# Patient Record
Sex: FEMALE | Race: WHITE | Employment: UNEMPLOYED | URBAN - METROPOLITAN AREA
[De-identification: names, ages, dates, MRNs, and addresses within clinical notes are randomized per-mention and may not be internally consistent; named-entity substitution may affect disease eponyms.]

---

## 2023-06-22 NOTE — H&P
History and Physical Service   [facility]    HISTORY AND PHYSICAL EXAMINATION            Date of Evaluation: 6/23/2023  Patient name:  Rajiv Valdivia  MRN:   [id number]  YOB: 1950  PCP:    Jason Prado MD    History Obtained From:     Patient, medical records     History of Present Illness: This is Rajiv Valdivia a 71 y.o. female who presents today for a COLORECTAL CANCER SCREENING, NOT HIGH RISK by Lori Naylor MD for Encounter for follow-up surveillance of colon cancer. Patient Hx colon cancer since 1991 with surveillance colonoscopies every 3 years. She was scheduled and arrived for her procedure. Patient completed the prep but had problems with generalized itching and nausea but no rash She went  until watery clear. Strong FH colon cancer and polyps on both Paternal and Maternal side. Patient denies bowel changes, bloody tarry stools, diarrhea alternating with constipation, abdominal pain or unexplained weight loss. Today no fever, chills, cough, wheezing, increased SOB or chest pain. Patient does not want anesthesia  She drank coffee with cream this am    Past Medical History:     Past Medical History:   Diagnosis Date    Cancer [facility])         Past Surgical History:     Past Surgical History:   Procedure Laterality Date    APPENDECTOMY      HYSTERECTOMY (CERVIX STATUS UNKNOWN)      TONSILLECTOMY      TUBAL LIGATION          Medications Prior to Admission:     Prior to Admission medications    Not on File        Allergies:     Patient has no known allergies. Social History:     Tobacco:    has no history on file for tobacco use. Alcohol:      has no history on file for alcohol use. Drug Use:  has no history on file for drug use. Family History:     No family history on file. Review of Systems:   Pertinent findings in the HPI above.   A comprehensive review of systems was essentially negative Denies fever, chills, cough, wheezing,  exertional chest pain, dyspnea,

## 2023-06-23 ENCOUNTER — HOSPITAL ENCOUNTER (OUTPATIENT)
Age: 73
Setting detail: OUTPATIENT SURGERY
Discharge: HOME | End: 2023-06-23
Attending: COLON & RECTAL SURGERY | Admitting: COLON & RECTAL SURGERY
Payer: MEDICARE

## 2023-06-23 ENCOUNTER — ANESTHESIA EVENT (OUTPATIENT)
Facility: HOSPITAL | Age: 73
End: 2023-06-23
Payer: MEDICARE

## 2023-06-23 ENCOUNTER — ANESTHESIA (OUTPATIENT)
Facility: HOSPITAL | Age: 73
End: 2023-06-23
Payer: MEDICARE

## 2023-06-23 VITALS
TEMPERATURE: 97.3 F | HEIGHT: 64 IN | SYSTOLIC BLOOD PRESSURE: 141 MMHG | OXYGEN SATURATION: 98 % | RESPIRATION RATE: 19 BRPM | HEART RATE: 60 BPM | BODY MASS INDEX: 20.32 KG/M2 | WEIGHT: 119 LBS | DIASTOLIC BLOOD PRESSURE: 85 MMHG

## 2023-06-23 DIAGNOSIS — Z08 ENCOUNTER FOR FOLLOW-UP SURVEILLANCE OF COLON CANCER: ICD-10-CM

## 2023-06-23 DIAGNOSIS — Z85.038 ENCOUNTER FOR FOLLOW-UP SURVEILLANCE OF COLON CANCER: ICD-10-CM

## 2023-06-23 PROCEDURE — 2709999900 HC NON-CHARGEABLE SUPPLY: Performed by: COLON & RECTAL SURGERY

## 2023-06-23 PROCEDURE — 3609010400 HC COLONOSCOPY POLYPECTOMY HOT BIOPSY: Performed by: COLON & RECTAL SURGERY

## 2023-06-23 PROCEDURE — 7100000011 HC PHASE II RECOVERY - ADDTL 15 MIN: Performed by: COLON & RECTAL SURGERY

## 2023-06-23 PROCEDURE — 2580000003 HC RX 258: Performed by: STUDENT IN AN ORGANIZED HEALTH CARE EDUCATION/TRAINING PROGRAM

## 2023-06-23 PROCEDURE — 7100000010 HC PHASE II RECOVERY - FIRST 15 MIN: Performed by: COLON & RECTAL SURGERY

## 2023-06-23 PROCEDURE — 88305 TISSUE EXAM BY PATHOLOGIST: CPT

## 2023-06-23 RX ORDER — SODIUM CHLORIDE 0.9 % (FLUSH) 0.9 %
5-40 SYRINGE (ML) INJECTION PRN
Status: DISCONTINUED | OUTPATIENT
Start: 2023-06-23 | End: 2023-06-23 | Stop reason: HOSPADM

## 2023-06-23 RX ORDER — SODIUM CHLORIDE, SODIUM LACTATE, POTASSIUM CHLORIDE, CALCIUM CHLORIDE 600; 310; 30; 20 MG/100ML; MG/100ML; MG/100ML; MG/100ML
INJECTION, SOLUTION INTRAVENOUS CONTINUOUS
Status: DISCONTINUED | OUTPATIENT
Start: 2023-06-23 | End: 2023-06-23 | Stop reason: HOSPADM

## 2023-06-23 RX ORDER — SODIUM CHLORIDE 9 MG/ML
INJECTION, SOLUTION INTRAVENOUS CONTINUOUS
Status: DISCONTINUED | OUTPATIENT
Start: 2023-06-23 | End: 2023-06-23 | Stop reason: HOSPADM

## 2023-06-23 RX ORDER — SODIUM CHLORIDE 0.9 % (FLUSH) 0.9 %
5-40 SYRINGE (ML) INJECTION EVERY 12 HOURS SCHEDULED
Status: DISCONTINUED | OUTPATIENT
Start: 2023-06-23 | End: 2023-06-23 | Stop reason: HOSPADM

## 2023-06-23 RX ORDER — CHLORAL HYDRATE 500 MG
CAPSULE ORAL DAILY
COMMUNITY

## 2023-06-23 RX ORDER — SODIUM CHLORIDE 9 MG/ML
INJECTION, SOLUTION INTRAVENOUS PRN
Status: DISCONTINUED | OUTPATIENT
Start: 2023-06-23 | End: 2023-06-23 | Stop reason: HOSPADM

## 2023-06-23 RX ORDER — AMLODIPINE BESYLATE 2.5 MG/1
1 TABLET ORAL DAILY
COMMUNITY
Start: 2023-06-12

## 2023-06-23 RX ORDER — LIDOCAINE HYDROCHLORIDE 10 MG/ML
1 INJECTION, SOLUTION EPIDURAL; INFILTRATION; INTRACAUDAL; PERINEURAL
Status: DISCONTINUED | OUTPATIENT
Start: 2023-06-23 | End: 2023-06-23 | Stop reason: HOSPADM

## 2023-06-23 RX ADMIN — SODIUM CHLORIDE, POTASSIUM CHLORIDE, SODIUM LACTATE AND CALCIUM CHLORIDE: 600; 310; 30; 20 INJECTION, SOLUTION INTRAVENOUS at 07:06

## 2023-06-23 ASSESSMENT — PAIN - FUNCTIONAL ASSESSMENT: PAIN_FUNCTIONAL_ASSESSMENT: 0-10

## 2023-06-23 ASSESSMENT — PAIN DESCRIPTION - DESCRIPTORS: DESCRIPTORS: ACHING

## 2023-06-23 NOTE — DISCHARGE INSTRUCTIONS
[facility]     POST-ENDOSCOPY INSTRUCTIONS    1. ACTIVITY  __x__ No driving , operating machinery, or making important decisions for 24 hours. Resume normal activity after 24 hours. You may return to work after 24 hours. ____ Resume normal activity    2. DIET       _x___ (Colonscopy/Flex Sig):  Resume your usual diet unless specified below. ____ Diet Modification:    3. MEDICATIONS (Do not consume alcohol, tranquilizers, or sleeping medications for           24 hours unless advised by your physician)       __x___ Resume your usual medications (except if taking blood thinners, hold for:   ____ days   ____week      4. PHYSICIAN FOLLOW-UP        _x___ Please call the office for an appointment/further instructions. ___x___3 yrs   ______5 yrs   ______10 yrs            _____ follow up in office in week    ______ days   ______weeks              ______months             ______ years             __x__ See your family physician. 5. ADDITIONAL INSTRUCTIONS          6. NORMAL CHANGES YOU MAY EXPERIENCE AFTER ENDOSCOPY:         COLONOSCOPY  Passing of gas for several hours after procedure  Some mild abdominal cramping  If a biopsy/polypectomy was done, you may see some spotting of blood  You may feel fatigued for the next 24-48 hours due to the prep and sedation    7. CALL YOUR PHYSICIAN IF YOU EXPERIENCE ANY OF THE FOLLOWING      A. Passing blood rectally or vomiting blood (color may be red or black)      B. Severe abdominal pain or tenderness (that is not relieved by passing air)      C.   Fever, chills, or excessive sweating      D.  Persistent nausea or vomiting      E.  Redness or swelling at the IV site    If you have additional questions, PLEASE call your doctor [phone]

## 2023-06-23 NOTE — OP NOTE
Operative Note      Patient: Andrea Alston  YOB: 1950  MRN: [id number]    Date of Procedure: 6/23/2023    Pre-Op Diagnosis Codes:     * Encounter for follow-up surveillance of colon cancer [Z08, Z85.038]    Post-Op Diagnosis: Same       Procedure(s):  COLONOSCOPY POLYPECTOMY HOT BIOPSY    Surgeon(s):  Odette Santiago MD    Assistant:   Resident: Clarice Graves DO    Anesthesia: None    Estimated Blood Loss (mL): Minimal    Complications: None    Specimens:   ID Type Source Tests Collected by Time Destination   A : TRANSVERSE COLON POLYP Tissue Colon-Transverse SURGICAL PATHOLOGY Ferny Kim MD 6/23/2023 0741        Implants:  * No implants in log *      Drains: * No LDAs found *    Findings: Pedunculated polyp noted within the transverse colon. Mild diverticulosis. Patent anastomosis. Detailed Description of Procedure:   HISTORY: The patient is a 71y.o. year old female with history of colon cancer s/p resection. Colonoscopy with possible biopsy or polypectomy has been recommended and I explained the risk, benefits, expected outcome, and alternatives to the procedure. Risks included but are not limited to bleeding, infection, respiratory distress, hypotension, and perforation of the colon. The patient understands and is in agreement. The patient requested that procedure be done without sedation. PROCEDURE: The patient was brought to the endoscopy suite and placed in left lateral decubitus position. The colonoscope was inserted per rectum and advanced under direct vision to the cecum without difficulty. Findings:  Cecum/Ascending colon: normal    Transverse colon: Polyp x1    Descending/Sigmoid colon: Patent anastomosis    Rectum/Anus: normal    The colon was decompressed and the scope was removed. The patient tolerated the procedure well. Dr. Patric Ferraro was present for the entirety of the procedure.      Recommendations:  -Singular polyp removed  -Follow up for pathologic results  -Return

## 2023-06-26 LAB — SURGICAL PATHOLOGY REPORT: NORMAL

## 2024-06-13 RX ORDER — CYST/ALA/Q10/PHOS.SER/DHA/BROC 100-20-50
1 POWDER (GRAM) ORAL DAILY
COMMUNITY

## 2024-06-27 ENCOUNTER — TELEPHONE (OUTPATIENT)
Age: 74
End: 2024-06-27

## 2024-06-27 ENCOUNTER — PROCEDURE VISIT (OUTPATIENT)
Age: 74
End: 2024-06-27
Payer: MEDICARE

## 2024-06-27 VITALS
BODY MASS INDEX: 21.8 KG/M2 | SYSTOLIC BLOOD PRESSURE: 130 MMHG | WEIGHT: 125 LBS | DIASTOLIC BLOOD PRESSURE: 72 MMHG | HEART RATE: 65 BPM

## 2024-06-27 DIAGNOSIS — L90.0 LICHEN SCLEROSUS ET ATROPHICUS: ICD-10-CM

## 2024-06-27 DIAGNOSIS — R32 URINARY INCONTINENCE, UNSPECIFIED TYPE: Primary | ICD-10-CM

## 2024-06-27 LAB
BILIRUBIN, POC: NORMAL
BLOOD URINE, POC: NORMAL
CLARITY, POC: CLEAR
COLOR, POC: YELLOW
GLUCOSE URINE, POC: NORMAL
KETONES, POC: 15
LEUKOCYTE EST, POC: NORMAL
NITRITE, POC: NORMAL
PH, POC: 5
PROTEIN, POC: NORMAL
SPECIFIC GRAVITY, POC: 1.02
UROBILINOGEN, POC: NORMAL

## 2024-06-27 PROCEDURE — 1090F PRES/ABSN URINE INCON ASSESS: CPT | Performed by: OBSTETRICS & GYNECOLOGY

## 2024-06-27 PROCEDURE — 99215 OFFICE O/P EST HI 40 MIN: CPT | Performed by: OBSTETRICS & GYNECOLOGY

## 2024-06-27 PROCEDURE — 3017F COLORECTAL CA SCREEN DOC REV: CPT | Performed by: OBSTETRICS & GYNECOLOGY

## 2024-06-27 PROCEDURE — G8427 DOCREV CUR MEDS BY ELIG CLIN: HCPCS | Performed by: OBSTETRICS & GYNECOLOGY

## 2024-06-27 PROCEDURE — G8420 CALC BMI NORM PARAMETERS: HCPCS | Performed by: OBSTETRICS & GYNECOLOGY

## 2024-06-27 PROCEDURE — 52000 CYSTOURETHROSCOPY: CPT | Performed by: OBSTETRICS & GYNECOLOGY

## 2024-06-27 PROCEDURE — 99459 PELVIC EXAMINATION: CPT | Performed by: OBSTETRICS & GYNECOLOGY

## 2024-06-27 PROCEDURE — G8400 PT W/DXA NO RESULTS DOC: HCPCS | Performed by: OBSTETRICS & GYNECOLOGY

## 2024-06-27 PROCEDURE — 1036F TOBACCO NON-USER: CPT | Performed by: OBSTETRICS & GYNECOLOGY

## 2024-06-27 PROCEDURE — 81003 URINALYSIS AUTO W/O SCOPE: CPT | Performed by: OBSTETRICS & GYNECOLOGY

## 2024-06-27 PROCEDURE — 1123F ACP DISCUSS/DSCN MKR DOCD: CPT | Performed by: OBSTETRICS & GYNECOLOGY

## 2024-06-27 PROCEDURE — 51725 SIMPLE CYSTOMETROGRAM: CPT | Performed by: OBSTETRICS & GYNECOLOGY

## 2024-06-27 PROCEDURE — 0509F URINE INCON PLAN DOCD: CPT | Performed by: OBSTETRICS & GYNECOLOGY

## 2024-06-27 NOTE — PROGRESS NOTES
palpable.  Myalgia and myositis of the pelvic floor not present to contra-indicate a procedure.  No significant complications or exposures of any prior placed mesh.  No significant pelvic organ prolapse contraindicating a procedure..  Rectum:      No rectal mass.   Breasts:     Right: Normal. No swelling, inverted nipple, mass, nipple discharge, skin change or tenderness.      Left: Normal. No swelling, inverted nipple, mass, nipple discharge, skin change or tenderness.   HENT:      Head: Normocephalic and atraumatic.      Right Ear: Tympanic membrane normal.      Left Ear: Tympanic membrane normal.      Nose: Nose normal.      Mouth/Throat:      Mouth: Mucous membranes are moist.      Pharynx: Oropharynx is clear.   Eyes:      Extraocular Movements: Extraocular movements intact.      Conjunctiva/sclera: Conjunctivae normal.   Cardiovascular:      Rate and Rhythm: Normal rate and regular rhythm.   Pulmonary:      Effort: Pulmonary effort is normal.      Breath sounds: Normal breath sounds.   Abdominal:      General: Abdomen is flat. Bowel sounds are normal.      Hernia: There is no hernia in the left inguinal area or right inguinal area.   Musculoskeletal:         General: Normal range of motion.      Cervical back: Normal range of motion and neck supple.   Lymphadenopathy:      Upper Body:      Right upper body: No supraclavicular or axillary adenopathy.      Left upper body: No supraclavicular or axillary adenopathy.   Neurological:      Mental Status: She is oriented to person, place, and time.   Psychiatric:         Mood and Affect: Mood normal.         Behavior: Behavior normal.   Vitals and nursing note reviewed.      Tests: Urodynamic shows a filling CMG of up to 200 mL with negative cough stress testing.  There is no instability on filling.  First desire is at 100 mL first sensation at 150 miles capacity 200 mL.  No detrusor sphincter dyssynergy.    Cystoscopy shows good coaptation at rest and with the

## 2024-06-27 NOTE — TELEPHONE ENCOUNTER
Patient is scheduled for in office BOTOX 8/9/24. She will have urine culture done 10 days prior at [facility] in [place], Patient was given order and it will be in Epic. Please advise how we obtain the BOTOX Rx.

## 2024-06-27 NOTE — PATIENT INSTRUCTIONS
[facility], [facility] UROGYNECOLOGY AND PELVIC REHABILITATION   [address] [place] [address]  Dept: [phone]   Patient:  Chery Richards   :  1950   Visit Date:  2024       [facility] UROGYNECOLOGY & PELVIC REHABILITATION      Cystoscopy Bladder Botox Injection  Patient Information, Treatment, and Anticipated Pre-Operative / Recovery Plan              Before Surgery    *Preadmission laboratory tests will be scheduled for you.  Report to the out-patient testing department at your assigned hospital.  Your tests may include blood work, a urinalysis, chest x-ray, electrocardiogram and a pregnancy test.  *The anesthesiologist will talk with you ahead of surgery regarding the specific type of anesthesia to be administered.  Inform the anesthesiologist if you wear dentures, or have a family history of anesthetic complications.  The pre-anesthesia team will tell you what time to report to the out-patient admitting office the day of your scheduled surgery.  *Do not bring jewelry or valuables with you to the hospital.  *Stopping smoking before surgery if strongly encouraged.    One Day Prior to Surgery     *ABSOLUTELY NO FOOD, LIQUID OR MEDICATION SHOULD BE TAKEN BY MOUTH AFTER MIDNIGHT OF THE DAY PRIOR TO SURGERY.  Please let Dr. Zaldivar and the anesthesiologist know if you take any medicines required daily, have particular sensitivities to preps & medicines or surgical processes (positioning, induction, postop nausea), or have a chronic illness.    After Surgery    *The length of your hospital stay can vary, but is typically 1-2 hours.  *Perform breathing exercises every hour while awake in the hospital to keep lungs clear of excess fluid; walking with assistance later in the day of surgery can help prevent blood clot formation.  *Normal activities, including sex, can be resumed in 1-2 weeks in most cases; meanwhile, do not place

## 2024-07-15 ENCOUNTER — PROCEDURE VISIT (OUTPATIENT)
Age: 74
End: 2024-07-15

## 2024-07-15 DIAGNOSIS — N31.8 FREQUENCY-URGENCY SYNDROME: ICD-10-CM

## 2024-07-15 DIAGNOSIS — R32 URINARY INCONTINENCE, UNSPECIFIED TYPE: Primary | ICD-10-CM

## 2024-07-15 DIAGNOSIS — N39.41 URGE INCONTINENCE: ICD-10-CM

## 2024-07-15 RX ORDER — MINOXIDIL 5 %
SOLUTION, NON-ORAL TOPICAL 2 TIMES DAILY
COMMUNITY
Start: 2024-04-16

## 2024-07-15 NOTE — PROGRESS NOTES
PTNS Questionnaire  Treatment # 1    Symptom Follow Up:  Urinary Incontinence:worse    Incomplete Emptying:Yes  Urinary Urgency: worse    Urinary Frequency:  unchanged    Nocturia: No  Dysuria: No  Leaks during [place]: No    Medical Review:  Caffeine Intake ( per day):3 cup coffee  Alcohol Intake (cups/day):1 cup and drink every 2 weeks  Daytime Voids (#/day):8  Nighttime Voids (#/night) with your: 0  Incontinence Episodes (#/day):1      Prior Testing  Urine Tests: Denies UTI symptoms  Culture: None    Procedure  Needle Placement: right  Treatment Setting:  Stimulation(min): 30  Feeling/Response:  Treatment Plan:    Post Procedure: Patient did tolerate the procedure well.

## 2024-07-22 ENCOUNTER — PROCEDURE VISIT (OUTPATIENT)
Age: 74
End: 2024-07-22

## 2024-07-22 DIAGNOSIS — N39.41 URGE INCONTINENCE: ICD-10-CM

## 2024-07-22 DIAGNOSIS — N31.8 FREQUENCY-URGENCY SYNDROME: ICD-10-CM

## 2024-07-22 DIAGNOSIS — R32 URINARY INCONTINENCE, UNSPECIFIED TYPE: Primary | ICD-10-CM

## 2024-07-22 NOTE — PROGRESS NOTES
PTNS Questionnaire  Treatment # 2    Symptom Follow Up:  Urinary Incontinence:improved    Incomplete Emptying:No  Urinary Urgency: improved    Urinary Frequency:  improved    Nocturia: No  Dysuria: No  Leaks during [place]: No    Medical Review:  Caffeine Intake ( per day):3 cup  Alcohol Intake (cups/day): 0  cup and none  Daytime Voids (#/day):8  Nighttime Voids (#/night) with your:1  Incontinence Episodes (#/day):2      Prior Testing  Urine Tests: Denies UTI symptoms  Culture: None    Procedure  Needle Placement: right  Treatment Setting:  Stimulation(min): 30  Feeling/Response:  Treatment Plan:    Post Procedure: Patient did tolerate the procedure well.

## 2024-07-30 ENCOUNTER — PROCEDURE VISIT (OUTPATIENT)
Age: 74
End: 2024-07-30

## 2024-07-30 VITALS — DIASTOLIC BLOOD PRESSURE: 74 MMHG | SYSTOLIC BLOOD PRESSURE: 116 MMHG

## 2024-07-30 DIAGNOSIS — N39.41 URGE INCONTINENCE: Primary | ICD-10-CM

## 2024-07-30 NOTE — PROGRESS NOTES
PTNS Questionnaire  Treatment # 3    Symptom Follow Up:  Urinary Incontinence:unchanged    Incomplete Emptying:No  Urinary Urgency: improved    Urinary Frequency:  improved    Nocturia: No  Dysuria: No  Leaks during [place]: No    Medical Review:  Caffeine Intake ( per day):3 cup  Alcohol Intake (cups/day): 0  cup  Daytime Voids (#/day):7  Nighttime Voids (#/night) with your: 0  Incontinence Episodes (#/day):2      Prior Testing  Urine Tests: Denies UTI symptoms  Culture: None    Procedure  Needle Placement: right  Treatment Settin  Stimulation(min): 30  Feeling/Response: [place]   Treatment Plan: Continue with treatments     Post Procedure: Patient did tolerate the procedure well.

## 2024-08-06 ENCOUNTER — PROCEDURE VISIT (OUTPATIENT)
Age: 74
End: 2024-08-06

## 2024-08-06 DIAGNOSIS — N39.41 URGE INCONTINENCE: Primary | ICD-10-CM

## 2024-08-06 DIAGNOSIS — R32 URINARY INCONTINENCE, UNSPECIFIED TYPE: ICD-10-CM

## 2024-08-06 DIAGNOSIS — N31.8 FREQUENCY-URGENCY SYNDROME: ICD-10-CM

## 2024-08-06 NOTE — PROGRESS NOTES
PTNS Questionnaire  Treatment # 4    Symptom Follow Up:  Urinary Incontinence:improved    Incomplete Emptying:Yes  Urinary Urgency: improved    Urinary Frequency:  improved    Nocturia: No- never   Dysuria: No  Leaks during [place]: No    Medical Review:  Caffeine Intake ( per day):3 cup  Alcohol Intake (cups/day): 0  cup  Daytime Voids (#/day):7  Nighttime Voids (#/night) with your: 0  Incontinence Episodes (#/day):1  -not every day, noticing improvement     Prior Testing  Urine Tests: Denies UTI symptoms  Culture: None    Procedure  Needle Placement: right  Treatment Settin  Stimulation(min): 30  Feeling/Response:yes   Treatment Plan:continue as planned     Post Procedure: Patient did tolerate the procedure well.     Patient making progress with PTNS. Continue therapy.

## 2024-08-13 ENCOUNTER — PROCEDURE VISIT (OUTPATIENT)
Age: 74
End: 2024-08-13

## 2024-08-13 DIAGNOSIS — L90.0 LICHEN SCLEROSUS ET ATROPHICUS: ICD-10-CM

## 2024-08-13 DIAGNOSIS — R32 URINARY INCONTINENCE, UNSPECIFIED TYPE: ICD-10-CM

## 2024-08-13 DIAGNOSIS — N39.41 URGE INCONTINENCE: Primary | ICD-10-CM

## 2024-08-13 DIAGNOSIS — N31.8 FREQUENCY-URGENCY SYNDROME: ICD-10-CM

## 2024-08-19 NOTE — PROGRESS NOTES
[facility], [facility] UROGYNECOLOGY AND PELVIC REHABILITATION   [address] [place] [address]  Dept: [phone]  Date: 2024  Patient Name: Chery Richards    VISIT - FOLLOW UP VISIT     CC: had no chief complaint listed for this encounter.    Chaperone present: Resident    HPI: 6th PTNS mid treatment follow up    Overall state of well-being, dietary and nutritional habits, exercise routines of at least 30 minutes 3 times a week, bowel and bladder function, smoking history, HRT history, sexual activity and partner/s, dyspareunia, and immunizations all revisited if necessary by chart review or direct questioning.    Topics of Prolapse, Pain, Pressure were revisited including type, period of onset, level of severity, quality and quantity, associations, trends, exacerbators, alleviators, bleeding, prolapse to reduce, splinting, and prior treatment / surgery.    Topics of Urinary Leakage were revisited including type, period of onset, level of severity, quality and quantity, associations, trends, exacerbators, alleviators, urgency, frequency, nocturia ,urge incontinence / stress incontinence triggers, hesitancy, obstruction with need to catheterize, frequent UTI\"s >3 in a year diagnosed by culture, hematuria (gross or microscopic), urinary stones, and prior treatment / surgery.    Topics of Fecal Incontinence were revisited including type, period of onset, level of severity, quality and quantity, associations, trends, exacerbators, alleviators, times per day/week, stool quality / quantity, rectal bleeding, hemorrhoids, constipation / Anismus / Proctalgia fugax, laxative abuse, and prior treatment / surgery.    Topics of General Gynecology were revisited including gravity/parity, #'s, perineal delivery trauma, LMP, days of flow, heaviest days, # pads / tampons per day, cramps, anemia, discharge, prior STI's.    Flowsheet:  Screenings reviewed

## 2024-08-20 ENCOUNTER — OFFICE VISIT (OUTPATIENT)
Age: 74
End: 2024-08-20
Payer: MEDICARE

## 2024-08-20 ENCOUNTER — PROCEDURE VISIT (OUTPATIENT)
Age: 74
End: 2024-08-20

## 2024-08-20 VITALS — SYSTOLIC BLOOD PRESSURE: 106 MMHG | DIASTOLIC BLOOD PRESSURE: 70 MMHG

## 2024-08-20 DIAGNOSIS — N31.8 FREQUENCY-URGENCY SYNDROME: ICD-10-CM

## 2024-08-20 DIAGNOSIS — N39.41 URGE INCONTINENCE: Primary | ICD-10-CM

## 2024-08-20 DIAGNOSIS — R32 URINARY INCONTINENCE, UNSPECIFIED TYPE: ICD-10-CM

## 2024-08-20 PROCEDURE — G8400 PT W/DXA NO RESULTS DOC: HCPCS | Performed by: OBSTETRICS & GYNECOLOGY

## 2024-08-20 PROCEDURE — 99213 OFFICE O/P EST LOW 20 MIN: CPT | Performed by: OBSTETRICS & GYNECOLOGY

## 2024-08-20 PROCEDURE — 1090F PRES/ABSN URINE INCON ASSESS: CPT | Performed by: OBSTETRICS & GYNECOLOGY

## 2024-08-20 PROCEDURE — 1036F TOBACCO NON-USER: CPT | Performed by: OBSTETRICS & GYNECOLOGY

## 2024-08-20 PROCEDURE — 0509F URINE INCON PLAN DOCD: CPT | Performed by: OBSTETRICS & GYNECOLOGY

## 2024-08-20 PROCEDURE — G8420 CALC BMI NORM PARAMETERS: HCPCS | Performed by: OBSTETRICS & GYNECOLOGY

## 2024-08-20 PROCEDURE — G8427 DOCREV CUR MEDS BY ELIG CLIN: HCPCS | Performed by: OBSTETRICS & GYNECOLOGY

## 2024-08-20 PROCEDURE — 1123F ACP DISCUSS/DSCN MKR DOCD: CPT | Performed by: OBSTETRICS & GYNECOLOGY

## 2024-08-20 PROCEDURE — 3017F COLORECTAL CA SCREEN DOC REV: CPT | Performed by: OBSTETRICS & GYNECOLOGY

## 2024-08-20 RX ORDER — KETOROLAC TROMETHAMINE 10 MG/1
10 TABLET, FILM COATED ORAL EVERY 6 HOURS PRN
COMMUNITY
Start: 2024-08-19

## 2024-08-20 NOTE — PROGRESS NOTES
PTNS Questionnaire  Treatment # 6    Symptom Follow Up:  Urinary Incontinence:improved    Incomplete Emptying:Yes  Urinary Urgency: improved    Urinary Frequency:  improved    Nocturia: No  Dysuria: No  Leaks during [place]: No    Medical Review:  Caffeine Intake ( per day):3 cup  Alcohol Intake (cups/day): 1  cup this week   Daytime Voids (#/day):4-5 improving   Nighttime Voids (#/night) with your: 0  Incontinence Episodes (#/day):1 every other day       Prior Testing  Urine Tests: Denies UTI symptoms  Culture: None    Procedure  Needle Placement: right  Treatment Settin  Stimulation(min): 30  Feeling/Response:  Treatment Plan:    Post Procedure: Patient did tolerate the procedure well.      Diagnosis Orders   1. Urge incontinence        2. Urinary incontinence, unspecified type        3. Frequency-urgency syndrome             Patient seeing over 80% improvement at this point with no nocturia and no leaks.  Electronically signed by Rome Zaldivar DO on 2024 at 7:51 AM

## 2024-08-27 ENCOUNTER — PROCEDURE VISIT (OUTPATIENT)
Age: 74
End: 2024-08-27

## 2024-08-27 DIAGNOSIS — N31.8 FREQUENCY-URGENCY SYNDROME: ICD-10-CM

## 2024-08-27 DIAGNOSIS — N39.41 URGE INCONTINENCE: Primary | ICD-10-CM

## 2024-08-27 DIAGNOSIS — R32 URINARY INCONTINENCE, UNSPECIFIED TYPE: ICD-10-CM

## 2024-08-27 NOTE — PROGRESS NOTES
PTNS Questionnaire  Treatment # 7  Pt states that she did have one day afte her knee surgery where she felt like things went back wards, unsure if it was from the [place] but was fine the next day   Symptom Follow Up:  Urinary Incontinence:improved    Incomplete Emptying:No  Urinary Urgency: improved    Urinary Frequency:  improved    Nocturia: No  Dysuria: No  Leaks during [place]: No    Medical Review:  Caffeine Intake ( per day):3 cup  Alcohol Intake (cups/day):1 cup week   Daytime Voids (#/day):5  Nighttime Voids (#/night) with your: 0  Incontinence Episodes (#/day): Much better Has not had any other then the day after her knee scope       Prior Testing  Urine Tests: Denies UTI symptoms  Culture: None    Procedure  Needle Placement: right  Treatment Setting:  Stimulation(min): 30  Feeling/Response:  Treatment Plan:    Post Procedure: Patient did tolerate the procedure well.     Patient doing well.  Continue therapy

## 2024-09-03 ENCOUNTER — PROCEDURE VISIT (OUTPATIENT)
Age: 74
End: 2024-09-03

## 2024-09-03 DIAGNOSIS — R39.15 URGENCY OF URINATION: Primary | ICD-10-CM

## 2024-09-03 DIAGNOSIS — N39.41 URGE INCONTINENCE: ICD-10-CM

## 2024-09-03 DIAGNOSIS — N31.8 FREQUENCY-URGENCY SYNDROME: ICD-10-CM

## 2024-09-03 LAB
BILIRUBIN, POC: NORMAL
BLOOD URINE, POC: NORMAL
CLARITY, POC: NORMAL
COLOR, POC: NORMAL
GLUCOSE URINE, POC: NORMAL MG/DL
KETONES, POC: NORMAL MG/DL
LEUKOCYTE EST, POC: NORMAL
NITRITE, POC: NORMAL
PH, POC: 5
PROTEIN, POC: NORMAL MG/DL
SPECIFIC GRAVITY, POC: 1.02
UROBILINOGEN, POC: NORMAL MG/DL

## 2024-09-03 NOTE — PROGRESS NOTES
PTNS Questionnaire  Treatment # 8  Yesterday she had bad day. Yesterday morning she did not have any urgency and had just a drop, and urgency a couple time though out the day. Today no symptoms     Symptom Follow Up:  Urinary Incontinence:improved    Incomplete Emptying:No  Urinary Urgency: worse   just the one day   Urinary Frequency:  improved    Nocturia: No  Dysuria: No  Leaks during [place]: No    Medical Review:  Caffeine Intake ( per day):3 cup  Alcohol Intake (cups/day):1 cup- ocassion  Daytime Voids (#/day):5  Nighttime Voids (#/night) with your: 0  Incontinence Episodes (#/day):1, Just       Prior Testing  Urine Tests:  will check do to urgency yesterday   Culture: None    Procedure  Needle Placement: right  Treatment Settin  Stimulation(min): 30  Feeling/Response:yes   Treatment Plan: continue     Post Procedure: Patient did tolerate the procedure well.     Patient doing well with therapy.  Continue as she is not having much urgency at all.  Electronically signed by Rome Zaldivar DO on 9/3/2024 at 3:05 PM

## 2024-09-11 ENCOUNTER — PROCEDURE VISIT (OUTPATIENT)
Age: 74
End: 2024-09-11

## 2024-09-11 VITALS — DIASTOLIC BLOOD PRESSURE: 62 MMHG | SYSTOLIC BLOOD PRESSURE: 105 MMHG

## 2024-09-11 DIAGNOSIS — N31.8 FREQUENCY-URGENCY SYNDROME: ICD-10-CM

## 2024-09-11 DIAGNOSIS — R39.15 URGENCY OF URINATION: Primary | ICD-10-CM

## 2024-09-11 DIAGNOSIS — N39.41 URGE INCONTINENCE: ICD-10-CM

## 2024-09-18 ENCOUNTER — PROCEDURE VISIT (OUTPATIENT)
Age: 74
End: 2024-09-18

## 2024-09-18 DIAGNOSIS — N31.8 FREQUENCY-URGENCY SYNDROME: ICD-10-CM

## 2024-09-18 DIAGNOSIS — R32 URINARY INCONTINENCE, UNSPECIFIED TYPE: ICD-10-CM

## 2024-09-18 DIAGNOSIS — N39.41 URGE INCONTINENCE: ICD-10-CM

## 2024-09-18 DIAGNOSIS — R39.15 URGENCY OF URINATION: Primary | ICD-10-CM

## 2024-10-03 ENCOUNTER — PROCEDURE VISIT (OUTPATIENT)
Age: 74
End: 2024-10-03

## 2024-10-03 DIAGNOSIS — R39.15 URGENCY OF URINATION: Primary | ICD-10-CM

## 2024-10-03 DIAGNOSIS — N39.41 URGE INCONTINENCE: ICD-10-CM

## 2024-10-03 DIAGNOSIS — N31.8 FREQUENCY-URGENCY SYNDROME: ICD-10-CM

## 2024-10-03 NOTE — PROGRESS NOTES
PTNS Questionnaire  Treatment # 11    Symptom Follow Up:  Urinary Incontinence:improved    Incomplete Emptying:Yes  Urinary Urgency: improved    Urinary Frequency:  improved    Nocturia: No  Dysuria: No  Leaks during [place]: n/a    Medical Review:  Caffeine Intake ( per day):3 cup  Alcohol Intake (cups/day):1 cup  Daytime Voids (#/day):5  Nighttime Voids (#/night) with your: 0  Incontinence Episodes (#/day):1      Prior Testing  Urine Tests: Denies UTI symptoms  Culture: 0    Procedure  Needle Placement: right  Treatment Setting:  Stimulation(min): 30  Feeling/Response:  Treatment Plan:    Post Procedure: Patient did tolerate the procedure well. Continue PTNS     Diagnosis Orders   1. Urgency of urination        2. Urge incontinence        3. Frequency-urgency syndrome

## 2024-10-09 NOTE — PROGRESS NOTES
treatment / surgery.    Topics of General Gynecology were revisited including gravity/parity, #'s, perineal delivery trauma, LMP, days of flow, heaviest days, # pads / tampons per day, cramps, anemia, discharge, prior STI's.    Flowsheet:  Screenings reviewed per Flowsheet including Pap smear, mammogram, dexascan, colonoscopy. Any screenings due will be ordered for this visit if patient agrees.      ROS:  Review of Systems   All other systems reviewed and are negative.      PHYSICAL EXAM:  There were no vitals taken for this visit.    Physical Exam  Constitutional:       Appearance: Normal appearance. She is normal weight.   Genitourinary:      Bladder, rectum and urethral meatus normal.      No lesions in the vagina.      Right Labia: No rash or lesions.     Left Labia: No lesions or rash.     Vaginal cuff intact.     No vaginal tenderness.      No vaginal prolapse present.     No vaginal atrophy present.       Right Adnexa: not tender and no mass present.     Left Adnexa: not tender and no mass present.     No cervical lesion.      Uterus is not enlarged or tender.      No uterine mass detected.     No urethral tenderness or mass present.      Pelvic Floor comments: Pelvic cavity: Transvaginal digital palpation and Kegel's squeeze palpable.  Myalgia and myositis of the pelvic floor not present to contra-indicate a procedure.  No significant complications or exposures of any prior placed mesh.  No significant pelvic organ prolapse contraindicating a procedure..  Rectum:      No rectal mass.   HENT:      Head: Normocephalic and atraumatic.      Right Ear: Tympanic membrane normal.      Left Ear: Tympanic membrane normal.      Nose: Nose normal.      Mouth/Throat:      Mouth: Mucous membranes are moist.      Pharynx: Oropharynx is clear.   Eyes:      Extraocular Movements: Extraocular movements intact.      Conjunctiva/sclera: Conjunctivae normal.   Cardiovascular:      Rate and Rhythm: Normal rate and regular

## 2024-10-10 ENCOUNTER — PROCEDURE VISIT (OUTPATIENT)
Age: 74
End: 2024-10-10

## 2024-10-10 ENCOUNTER — OFFICE VISIT (OUTPATIENT)
Age: 74
End: 2024-10-10
Payer: MEDICARE

## 2024-10-10 DIAGNOSIS — N39.41 URGE INCONTINENCE: ICD-10-CM

## 2024-10-10 DIAGNOSIS — N31.8 FREQUENCY-URGENCY SYNDROME: Primary | ICD-10-CM

## 2024-10-10 DIAGNOSIS — R32 URINARY INCONTINENCE, UNSPECIFIED TYPE: ICD-10-CM

## 2024-10-10 DIAGNOSIS — R39.15 URGENCY OF URINATION: Primary | ICD-10-CM

## 2024-10-10 DIAGNOSIS — N31.8 FREQUENCY-URGENCY SYNDROME: ICD-10-CM

## 2024-10-10 PROCEDURE — 1036F TOBACCO NON-USER: CPT | Performed by: NURSE PRACTITIONER

## 2024-10-10 PROCEDURE — G8400 PT W/DXA NO RESULTS DOC: HCPCS | Performed by: NURSE PRACTITIONER

## 2024-10-10 PROCEDURE — 0509F URINE INCON PLAN DOCD: CPT | Performed by: NURSE PRACTITIONER

## 2024-10-10 PROCEDURE — G8428 CUR MEDS NOT DOCUMENT: HCPCS | Performed by: NURSE PRACTITIONER

## 2024-10-10 PROCEDURE — G8484 FLU IMMUNIZE NO ADMIN: HCPCS | Performed by: NURSE PRACTITIONER

## 2024-10-10 PROCEDURE — G8420 CALC BMI NORM PARAMETERS: HCPCS | Performed by: NURSE PRACTITIONER

## 2024-10-10 PROCEDURE — 3017F COLORECTAL CA SCREEN DOC REV: CPT | Performed by: NURSE PRACTITIONER

## 2024-10-10 PROCEDURE — 1090F PRES/ABSN URINE INCON ASSESS: CPT | Performed by: NURSE PRACTITIONER

## 2024-10-10 PROCEDURE — 1123F ACP DISCUSS/DSCN MKR DOCD: CPT | Performed by: NURSE PRACTITIONER

## 2024-10-10 PROCEDURE — 99213 OFFICE O/P EST LOW 20 MIN: CPT | Performed by: NURSE PRACTITIONER

## 2024-10-10 NOTE — PROGRESS NOTES
PTNS Questionnaire  Treatment # 12    Symptom Follow Up:  Urinary Incontinence:improved    Incomplete Emptying:Yes  Urinary Urgency: improved    Urinary Frequency:  improved    Nocturia: No  Dysuria: No  Leaks during [place]: N/A    Medical Review:  Caffeine Intake ( per day):3 cup  Alcohol Intake (cups/day):1 cup monthly  Daytime Voids (#/day):5  Nighttime Voids (#/night) with your: 0  Incontinence Episodes (#/day):1      Prior Testing  Urine Tests: Denies UTI symptoms  Culture: None    Procedure  Needle Placement: left  Treatment Setting:  Stimulation(min): 30  Feeling/Response:  Treatment Plan:    Post Procedure: Patient did tolerate the procedure well.     [facility], [facility] UROGYNECOLOGY AND PELVIC REHABILITATION   [address] [place] [address]  Dept: [phone]   Patient:  Chery Richards   :  1950   Visit Date:  10/10/2024     VISIT - NEUROMODULATION PTNS     CC: The patient presents for Posterior Tibial Nerve Stimulation (PTNS).  had concerns including PTNS (#12 ).     Chaperone present: None Required    HPI: Indications for PTNS include refractory urge urinary incontinence, urgency-frequency syndrome, OAB    Visit number # 12    PTNS QUESTIONNAIRE ANSWERS  See nurse note, this encounter    ROS:  Urinary urgency, frequency, and urge incontinence.  All other systems negative    PHYSICAL EXAM:  There were no vitals taken for this visit.    Alert and oriented x 3 in no distress  Normal breathing pattern with unlabored exhalation  Soft nontender abdomen  Negative Homans' sign distal pulses 2 over 4 x 4 and no abnormal edema  No neurologic issues  Pelvic exam deferred due to no symptoms.    ASSESSMENT/PLAN:    No results found for any visits on 10/10/24.     Urgency of urination  Urge incontinence  Frequency-urgency syndrome       No orders of the defined types were placed in this encounter.       Conservative versus surgical

## 2024-10-22 ENCOUNTER — TELEPHONE (OUTPATIENT)
Age: 74
End: 2024-10-22

## 2024-10-22 NOTE — TELEPHONE ENCOUNTER
Patient will be in [place] for the winter. She is needing a referral for PTNS created and sent to [facility] in [place], [place], Fax # [phone].

## 2025-05-08 ENCOUNTER — PROCEDURE VISIT (OUTPATIENT)
Age: 75
End: 2025-05-08

## 2025-05-08 DIAGNOSIS — N39.41 URGE INCONTINENCE: Primary | ICD-10-CM

## 2025-05-08 NOTE — PROGRESS NOTES
PTNS Questionnaire  Treatment # 13    Symptom Follow Up:  Urinary Incontinence:worse    Incomplete Emptying:Yes  Urinary Urgency: worse    Urinary Frequency:  worse    Nocturia: No  Dysuria: No  Leaks during [place]: NA    Medical Review:  Caffeine Intake ( per day):3 cup  Alcohol Intake (cups/day):1 cup monthly  Daytime Voids (#/day): 12  Nighttime Voids (#/night) with your: 0  Incontinence Episodes (#/day):3      Prior Testing  Urine Tests: NO  Culture: None    Procedure  Needle Placement: right  Treatment Setting:  Stimulation(min): 30  Feeling/Response:  Treatment Plan:    Post Procedure: Patient did tolerate the procedure well.     Doing well - continue PTNS

## 2025-05-29 ENCOUNTER — PROCEDURE VISIT (OUTPATIENT)
Age: 75
End: 2025-05-29

## 2025-05-29 DIAGNOSIS — N39.41 URGE INCONTINENCE: Primary | ICD-10-CM

## 2025-05-29 NOTE — PROGRESS NOTES
PTNS Questionnaire  Treatment # 14    Symptom Follow Up:  Urinary Incontinence:improved    Incomplete Emptying:Yes  Urinary Urgency: improved    Urinary Frequency:  improved    Nocturia: No  Dysuria: No  Leaks during [place]: NA    Medical Review:  Caffeine Intake ( per day):3 cup  Alcohol Intake (cups/day):1 cup and monthly   Daytime Voids (#/day):8  Nighttime Voids (#/night) with your: 0  Incontinence Episodes (#/day): tiny leak everytime she uses the restroom       Prior Testing  Urine Tests: Denies UTI symptoms  Culture: na    Procedure  Needle Placement: right  Treatment Setting:  Stimulation(min): 30  Feeling/Response:  Treatment Plan:    Post Procedure: Patient did tolerate the procedure well.

## 2025-06-19 ENCOUNTER — PROCEDURE VISIT (OUTPATIENT)
Age: 75
End: 2025-06-19

## 2025-06-19 VITALS — DIASTOLIC BLOOD PRESSURE: 82 MMHG | SYSTOLIC BLOOD PRESSURE: 126 MMHG | HEART RATE: 82 BPM

## 2025-06-19 DIAGNOSIS — N39.41 URGE INCONTINENCE: Primary | ICD-10-CM

## 2025-06-19 RX ORDER — DIAZEPAM 10 MG/1
TABLET ORAL
COMMUNITY
Start: 2025-05-22

## 2025-06-19 NOTE — PROGRESS NOTES
PTNS Questionnaire  Treatment # 15    Symptom Follow Up:  Urinary Incontinence:improved    Incomplete Emptying:Yes  Urinary Urgency: improved   but continues to have   Urinary Frequency:  improved    Nocturia: No  Dysuria: No  Leaks during [place]: No N/A     Medical Review:  Caffeine Intake ( per day):3 cup  Alcohol Intake (cups/day):1 cup  Daytime Voids (#/day):7  Nighttime Voids (#/night) with your:0  Incontinence Episodes (#/day):2, small dribbles       Prior Testing  Urine Tests: Denies UTI symptoms  Culture: None    Procedure  Needle Placement: right  Treatment Settin  Stimulation(min): 30  Feeling/Response:  Treatment Plan:    Post Procedure: Patient did tolerate the procedure well.     Continue therapy.

## 2025-07-10 ENCOUNTER — PROCEDURE VISIT (OUTPATIENT)
Age: 75
End: 2025-07-10
Payer: MEDICARE

## 2025-07-10 ENCOUNTER — TELEPHONE (OUTPATIENT)
Age: 75
End: 2025-07-10

## 2025-07-10 DIAGNOSIS — N39.41 URGE INCONTINENCE: Primary | ICD-10-CM

## 2025-07-10 DIAGNOSIS — N31.8 FREQUENCY-URGENCY SYNDROME: ICD-10-CM

## 2025-07-10 PROCEDURE — G8400 PT W/DXA NO RESULTS DOC: HCPCS | Performed by: OBSTETRICS & GYNECOLOGY

## 2025-07-10 PROCEDURE — 99213 OFFICE O/P EST LOW 20 MIN: CPT | Performed by: OBSTETRICS & GYNECOLOGY

## 2025-07-10 PROCEDURE — 1123F ACP DISCUSS/DSCN MKR DOCD: CPT | Performed by: OBSTETRICS & GYNECOLOGY

## 2025-07-10 PROCEDURE — 1160F RVW MEDS BY RX/DR IN RCRD: CPT | Performed by: OBSTETRICS & GYNECOLOGY

## 2025-07-10 PROCEDURE — 1159F MED LIST DOCD IN RCRD: CPT | Performed by: OBSTETRICS & GYNECOLOGY

## 2025-07-10 PROCEDURE — G8421 BMI NOT CALCULATED: HCPCS | Performed by: OBSTETRICS & GYNECOLOGY

## 2025-07-10 PROCEDURE — 0509F URINE INCON PLAN DOCD: CPT | Performed by: OBSTETRICS & GYNECOLOGY

## 2025-07-10 PROCEDURE — 1090F PRES/ABSN URINE INCON ASSESS: CPT | Performed by: OBSTETRICS & GYNECOLOGY

## 2025-07-10 PROCEDURE — G8427 DOCREV CUR MEDS BY ELIG CLIN: HCPCS | Performed by: OBSTETRICS & GYNECOLOGY

## 2025-07-10 PROCEDURE — 3017F COLORECTAL CA SCREEN DOC REV: CPT | Performed by: OBSTETRICS & GYNECOLOGY

## 2025-07-10 PROCEDURE — 1036F TOBACCO NON-USER: CPT | Performed by: OBSTETRICS & GYNECOLOGY

## 2025-07-10 NOTE — PROGRESS NOTES
PTNS Questionnaire  Treatment # 16    Symptom Follow Up:  Urinary Incontinence:unchanged    Incomplete Emptying:Yes  Urinary Urgency: unchanged    Urinary Frequency:  unchanged    Nocturia: No  Dysuria: No  Leaks during [place]: NA    Medical Review:  Caffeine Intake ( per day):3 cup increased last night  Alcohol Intake (cups/day):0 cup and none  Daytime Voids (#/day):7  Nighttime Voids (#/night) with your:0  Incontinence Episodes (#/day):1-2 daily small dribbles      Prior Testing  Urine Tests: Denies UTI symptoms  Culture: None    Procedure  Needle Placement: right  Treatment Setting:  Stimulation(min): 30  Feeling/Response:  Treatment Plan:    Post Procedure: Patient did tolerate the procedure well.  Although the procedure seems to help her it efficacy is not working as well as when she did the initial 12 sessions.  Incontinence episodes with small dribbles and urgency during the day are the biggest problems. She attributes this to the inability to continue PTNS over the winter in [place] as she could not be set up in a timely manner per a urologist in her winter locale.  She will be setting that up ahead of time this year.  She is asking to repeat a 12-week program in our office.  We will seek approval from her insurer.  She would also like a pelvic floor therapy order for urge incontinence and urgency frequency.

## 2025-08-05 ENCOUNTER — PROCEDURE VISIT (OUTPATIENT)
Age: 75
End: 2025-08-05

## 2025-08-05 VITALS — DIASTOLIC BLOOD PRESSURE: 74 MMHG | SYSTOLIC BLOOD PRESSURE: 126 MMHG | HEART RATE: 63 BPM | OXYGEN SATURATION: 97 %

## 2025-08-05 DIAGNOSIS — N39.41 URGE INCONTINENCE: Primary | ICD-10-CM

## 2025-08-05 DIAGNOSIS — N31.8 FREQUENCY-URGENCY SYNDROME: ICD-10-CM

## 2025-08-26 ENCOUNTER — PROCEDURE VISIT (OUTPATIENT)
Age: 75
End: 2025-08-26
Payer: MEDICARE

## 2025-08-26 DIAGNOSIS — N39.41 URGE INCONTINENCE: Primary | ICD-10-CM

## 2025-08-26 PROCEDURE — 64566 NEUROELTRD STIM POST TIBIAL: CPT | Performed by: OBSTETRICS & GYNECOLOGY

## 2025-09-02 ENCOUNTER — PROCEDURE VISIT (OUTPATIENT)
Age: 75
End: 2025-09-02
Payer: MEDICARE

## 2025-09-02 VITALS — DIASTOLIC BLOOD PRESSURE: 49 MMHG | SYSTOLIC BLOOD PRESSURE: 114 MMHG

## 2025-09-02 DIAGNOSIS — N39.41 URGE INCONTINENCE: Primary | ICD-10-CM

## 2025-09-02 DIAGNOSIS — R39.15 URGENCY OF URINATION: ICD-10-CM

## 2025-09-02 DIAGNOSIS — N31.8 FREQUENCY-URGENCY SYNDROME: ICD-10-CM

## 2025-09-02 DIAGNOSIS — R32 URINARY INCONTINENCE, UNSPECIFIED TYPE: ICD-10-CM

## 2025-09-02 PROCEDURE — 64566 NEUROELTRD STIM POST TIBIAL: CPT | Performed by: OBSTETRICS & GYNECOLOGY

## (undated) DEVICE — FORCEPS BX L240CM JAW DIA2.2MM RAD JAW 4 HOT DISP

## (undated) DEVICE — BASIN EMSIS 700ML GRAPHITE PLAS KID SHP GRAD

## (undated) DEVICE — SYRINGE MED 50ML LUERLOCK TIP

## (undated) DEVICE — TUBING, SUCTION, 1/4" X 12', STRAIGHT: Brand: MEDLINE

## (undated) DEVICE — ADAPTER TBNG LUER STUB 15 GA INTMED

## (undated) DEVICE — MEDICINE CUP, GRADUATED, STER: Brand: MEDLINE

## (undated) DEVICE — CO2 CANNULA,SUPERSOFT, ADLT,7'O2,7'CO2: Brand: MEDLINE